# Patient Record
Sex: FEMALE | Race: WHITE | Employment: OTHER | URBAN - METROPOLITAN AREA
[De-identification: names, ages, dates, MRNs, and addresses within clinical notes are randomized per-mention and may not be internally consistent; named-entity substitution may affect disease eponyms.]

---

## 2022-03-08 ENCOUNTER — NEW PATIENT (OUTPATIENT)
Dept: URBAN - METROPOLITAN AREA CLINIC 26 | Facility: CLINIC | Age: 87
End: 2022-03-08

## 2022-03-08 VITALS
BODY MASS INDEX: 20.09 KG/M2 | HEART RATE: 60 BPM | SYSTOLIC BLOOD PRESSURE: 125 MMHG | HEIGHT: 66 IN | DIASTOLIC BLOOD PRESSURE: 74 MMHG | WEIGHT: 125 LBS

## 2022-03-08 DIAGNOSIS — H04.123: ICD-10-CM

## 2022-03-08 DIAGNOSIS — H35.3112: ICD-10-CM

## 2022-03-08 DIAGNOSIS — H35.3221: ICD-10-CM

## 2022-03-08 DIAGNOSIS — H35.353: ICD-10-CM

## 2022-03-08 PROCEDURE — 99204 OFFICE O/P NEW MOD 45 MIN: CPT

## 2022-03-08 ASSESSMENT — TONOMETRY
OD_IOP_MMHG: 14
OS_IOP_MMHG: 14

## 2022-03-08 ASSESSMENT — VISUAL ACUITY
OS_SC: 20/30-2
OD_SC: 20/50-1

## 2022-03-08 NOTE — PATIENT DISCUSSION
AVASTIN TX (#1) OS 2/24/2022 BY DR. Irene Yun - PT DOES NOT WANT TO GO BACK - WANTS TO COME HERE BASED ON HER FRIENDS RECOMENDATIONS.

## 2022-03-29 ENCOUNTER — FOLLOW UP (OUTPATIENT)
Dept: URBAN - METROPOLITAN AREA CLINIC 26 | Facility: CLINIC | Age: 87
End: 2022-03-29

## 2022-03-29 VITALS — DIASTOLIC BLOOD PRESSURE: 80 MMHG | HEART RATE: 58 BPM | HEIGHT: 55 IN | SYSTOLIC BLOOD PRESSURE: 139 MMHG

## 2022-03-29 DIAGNOSIS — H35.3221: ICD-10-CM

## 2022-03-29 DIAGNOSIS — H04.123: ICD-10-CM

## 2022-03-29 DIAGNOSIS — H35.353: ICD-10-CM

## 2022-03-29 DIAGNOSIS — H35.3112: ICD-10-CM

## 2022-03-29 PROCEDURE — 92250 FUNDUS PHOTOGRAPHY W/I&R: CPT

## 2022-03-29 PROCEDURE — 92014 COMPRE OPH EXAM EST PT 1/>: CPT

## 2022-03-29 PROCEDURE — 67028 INJECTION EYE DRUG: CPT

## 2022-03-29 PROCEDURE — 92235 FLUORESCEIN ANGRPH MLTIFRAME: CPT

## 2022-03-29 PROCEDURE — 92134 CPTRZ OPH DX IMG PST SGM RTA: CPT

## 2022-03-29 ASSESSMENT — VISUAL ACUITY
OS_SC: 20/40
OD_PH: 20/50
OD_SC: 20/80

## 2022-03-29 ASSESSMENT — TONOMETRY
OS_IOP_MMHG: 19
OD_IOP_MMHG: 15

## 2022-03-29 NOTE — PATIENT DISCUSSION
AVASTIN TX (#1) OS 2/24/2022 BY DR. Connor Deluna - PT DOES NOT WANT TO GO BACK - WANTS TO COME HERE BASED ON HER FRIENDS RECOMENDATIONS.

## 2022-03-29 NOTE — PROCEDURE NOTE: CLINICAL
PROCEDURE NOTE: Intravitreal Ozurdex OD. Diagnosis: Cystoid Macular Degeneration. Anesthesia: Subconjunctival. Prep: Betadine Flush. Prior to injection, risks/benefits/alternatives discussed including infection, loss of vision, hemorrhage, cataract, glaucoma, retinal tears or detachment and patient wished to proceed. The patient was seated in the examination chair. Topical anesthesia was induced with Proparicaine 0.5%. Subconjunctival xylocaine 2% approximately 0.5 cc was given for anesthesia. Betadine Prep was performed. The Ozurdex drug implant (dexamethasone 0.7 mg intravitreal implant) was injected into the vitreous cavity. The needle was passed 3.0 mm posterior to the limbus in pseudophakic patients, and 3.5 mm posterior to the limbus in phakic patients. The eye was stabilized using a q tip or cotton tip applicator, and the conjunctiva was displaced from the injection site. The remainder of the intravitreal Ozurdex in the single-use vial was then discarded in a medical waste disposal container. The implant settled inferiorly. The retina was examined following the injection. There was no evidence of hemorrhage, subretinal injection, or retinal detachment. Patient tolerated procedure well. There were no complications. Post-op instructions given. Lot # [id number]. Expiration date: 12:35PM/*. The patient was instructed of a follow up appointment in approximately 6 weeks for a limited exam and pressure check and was told to call immediately if the vision decreases and/or if the patient’s eye becomes red, painful, and/or light sensitive. If the patient is unable to reach the doctor within an hour or two, the patient is instructed to go to the emergency room or call 911. Inventory Id: *. The patient was instructed to use Artificial Tears q.i.d. p.r.n for comfort. Kenyetta Ely

## 2022-03-29 NOTE — PATIENT DISCUSSION
3 29 22 NO DEF FLUID - NO DEF CNV ON FA - ? EDEMA MIGHT HAVE BEEN DUE TO CME - TO HOLD ON ANTI-VEGF AND CONTINUE DROPS.

## 2022-03-31 ENCOUNTER — FOLLOW UP (OUTPATIENT)
Dept: URBAN - METROPOLITAN AREA CLINIC 26 | Facility: CLINIC | Age: 87
End: 2022-03-31

## 2022-03-31 DIAGNOSIS — H35.3221: ICD-10-CM

## 2022-03-31 DIAGNOSIS — H04.123: ICD-10-CM

## 2022-03-31 DIAGNOSIS — H35.3112: ICD-10-CM

## 2022-03-31 DIAGNOSIS — H35.353: ICD-10-CM

## 2022-03-31 PROCEDURE — 99211 OFF/OP EST MAY X REQ PHY/QHP: CPT

## 2022-03-31 ASSESSMENT — TONOMETRY
OS_IOP_MMHG: 11
OD_IOP_MMHG: 15

## 2022-03-31 ASSESSMENT — VISUAL ACUITY
OS_PH: 20/30-2
OD_SC: 20/40-2
OS_SC: 20/50+2

## 2022-03-31 NOTE — PATIENT DISCUSSION
AVASTIN TX (#1) OS 2/24/2022 BY DR. Eli Samuel - PT DOES NOT WANT TO GO BACK - WANTS TO COME HERE BASED ON HER FRIENDS RECOMENDATIONS. Breath sounds clear and equal bilaterally.

## 2022-04-19 ENCOUNTER — FOLLOW UP (OUTPATIENT)
Dept: URBAN - METROPOLITAN AREA CLINIC 26 | Facility: CLINIC | Age: 87
End: 2022-04-19

## 2022-04-19 VITALS
BODY MASS INDEX: 20.41 KG/M2 | SYSTOLIC BLOOD PRESSURE: 132 MMHG | HEIGHT: 66 IN | WEIGHT: 127 LBS | HEART RATE: 68 BPM | DIASTOLIC BLOOD PRESSURE: 80 MMHG

## 2022-04-19 DIAGNOSIS — H35.3112: ICD-10-CM

## 2022-04-19 DIAGNOSIS — H35.3221: ICD-10-CM

## 2022-04-19 DIAGNOSIS — H30.93: ICD-10-CM

## 2022-04-19 DIAGNOSIS — H35.353: ICD-10-CM

## 2022-04-19 DIAGNOSIS — H04.123: ICD-10-CM

## 2022-04-19 PROCEDURE — 92235 FLUORESCEIN ANGRPH MLTIFRAME: CPT

## 2022-04-19 PROCEDURE — 92014 COMPRE OPH EXAM EST PT 1/>: CPT

## 2022-04-19 PROCEDURE — 92134 CPTRZ OPH DX IMG PST SGM RTA: CPT

## 2022-04-19 PROCEDURE — 92250 FUNDUS PHOTOGRAPHY W/I&R: CPT

## 2022-04-19 PROCEDURE — 67028 INJECTION EYE DRUG: CPT

## 2022-04-19 ASSESSMENT — TONOMETRY
OS_IOP_MMHG: 18
OD_IOP_MMHG: 20

## 2022-04-19 ASSESSMENT — VISUAL ACUITY
OD_PH: 20/40-1
OS_SC: 20/30
OD_SC: 20/60-1

## 2022-04-19 NOTE — PROCEDURE NOTE: CLINICAL
PROCEDURE NOTE: Intravitreal Ozurdex OS. Diagnosis: Posterior Uveitis. Anesthesia: Topical. Prep: Betadine Flush. Prior to injection, risks/benefits/alternatives discussed including infection, loss of vision, hem06/01/2024orrhage, cataract, glaucoma, retinal tears or detachment and patient wished to proceed. The patient was seated in the examination chair. Topical anesthesia was induced with Proparicaine 0.5%. Subconjunctival xylocaine 2% approximately 0.5 cc was given for anesthesia. Betadine Prep was performed. The Ozurdex drug implant (dexamethasone 0.7 mg intravitreal implant) was injected into the vitreous cavity. The needle was passed 3.0 mm posterior to the limbus in pseudophakic patients, and 3.5 mm posterior to the limbus in phakic patients. The eye was stabilized using a q tip or cotton tip applicator, and the conjunctiva was displaced from the injection site. The remainder of the intravitreal Ozurdex in the single-use vial was then discarded in a medical waste disposal container. The implant settled inferiorly. The retina was examined following the injection. There was no evidence of hemorrhage, subretinal injection, or retinal detachment. Patient tolerated procedure well. There were no complications. Post-op instructions given. Lot # [id number]. Expiration date: */*. The patient was instructed of a follow up appointment in approximately 6 weeks for a limited exam and pressure check and was told to call immediately if the vision decreases and/or if the patient’s eye becomes red, painful, and/or light sensitive. If the patient is unable to reach the doctor within an hour or two, the patient is instructed to go to the emergency room or call 911. Inventory Id: *. The patient was instructed to use Artificial Tears q.i.d. p.r.n for comfort. Gaye Muro

## 2022-04-19 NOTE — PATIENT DISCUSSION
Impression: Age-related nuclear cataract, bilateral: H25.13. Condition: quality of life issue. Symptoms: could improve with surgery. Vision: vision affected. Plan: Cataracts account for some decreased vision, however, the patient is aware with macular changes that level of vision post operatively cannot be determined. Discussed risks, benefits, procedures and recovery. Patient desires surgery, recommend phacoemulsification with intraocular lens.  RL-2 Recommend standard IOL aim plano with Dexy Cu Well positioned.

## 2022-04-19 NOTE — PATIENT DISCUSSION
4 19 22 PROB CAUSE OF EDEMA OS- RECOM TRIAL WITH OZURDEX OS AS WORKED WELL OD AND FINDINGS LOOKS SIMILAR - OZURDEX OS AND STOP DROPS IN 2 DAYS.

## 2022-05-03 ENCOUNTER — TECH ONLY (OUTPATIENT)
Dept: URBAN - METROPOLITAN AREA CLINIC 26 | Facility: CLINIC | Age: 87
End: 2022-05-03

## 2022-05-03 DIAGNOSIS — H30.93: ICD-10-CM

## 2022-05-03 DIAGNOSIS — H04.123: ICD-10-CM

## 2022-05-03 DIAGNOSIS — H35.353: ICD-10-CM

## 2022-05-03 DIAGNOSIS — H35.3112: ICD-10-CM

## 2022-05-03 DIAGNOSIS — H35.3221: ICD-10-CM

## 2022-05-03 PROCEDURE — 99211 OFF/OP EST MAY X REQ PHY/QHP: CPT

## 2022-05-03 ASSESSMENT — VISUAL ACUITY
OS_PH: 20/25-1
OD_SC: 20/70-1
OS_SC: 20/40-1
OD_PH: 20/40+2

## 2022-05-03 ASSESSMENT — TONOMETRY
OD_IOP_MMHG: 16
OS_IOP_MMHG: 18

## 2022-05-03 NOTE — PATIENT DISCUSSION
AVASTIN TX (#1) OS 2/24/2022 BY DR. Dirk Reed - PT DOES NOT WANT TO GO BACK - WANTS TO COME HERE BASED ON HER FRIENDS RECOMENDATIONS.

## 2022-05-24 ENCOUNTER — FOLLOW UP (OUTPATIENT)
Dept: URBAN - METROPOLITAN AREA CLINIC 26 | Facility: CLINIC | Age: 87
End: 2022-05-24

## 2022-05-24 VITALS — HEIGHT: 55 IN | DIASTOLIC BLOOD PRESSURE: 67 MMHG | HEART RATE: 62 BPM | SYSTOLIC BLOOD PRESSURE: 131 MMHG

## 2022-05-24 DIAGNOSIS — H35.353: ICD-10-CM

## 2022-05-24 DIAGNOSIS — H04.123: ICD-10-CM

## 2022-05-24 DIAGNOSIS — H30.93: ICD-10-CM

## 2022-05-24 DIAGNOSIS — H35.3221: ICD-10-CM

## 2022-05-24 DIAGNOSIS — H35.3112: ICD-10-CM

## 2022-05-24 PROCEDURE — 92134 CPTRZ OPH DX IMG PST SGM RTA: CPT

## 2022-05-24 PROCEDURE — 92235 FLUORESCEIN ANGRPH MLTIFRAME: CPT

## 2022-05-24 PROCEDURE — 92014 COMPRE OPH EXAM EST PT 1/>: CPT

## 2022-05-24 PROCEDURE — 92250 FUNDUS PHOTOGRAPHY W/I&R: CPT

## 2022-05-24 ASSESSMENT — TONOMETRY
OD_IOP_MMHG: 18
OS_IOP_MMHG: 16

## 2022-05-24 ASSESSMENT — VISUAL ACUITY
OS_CC: 20/30+2
OD_SC: 20/40
OS_SC: 20/40+1
OD_CC: 20/40+2

## 2023-01-11 NOTE — PATIENT DISCUSSION
3 29 22 MILD EDEMA OD DESPITE DROPS - OZURDEX OD - THEN STOP OD IN 2 DAYS.
3 29 22 NO DEF FLUID - NO DEF CNV ON FA - ? EDEMA MIGHT HAVE BEEN DUE TO CME - TO HOLD ON ANTI-VEGF AND CONTINUE DROPS.
3 29 22 OZURDEX - MILD EDEMA.
3 8 22 TO REEVAL IN @ 3 WKS - TO SEE HOW SHE RESPONDED TO HER TREATMENT.
3/31/2022 [facility]NL KEEP SCHEDULED F/U.
4 19 22 NEW FLUID OS - SEE POSTERIOR UVEITIS.
4 19 22 PROB CAUSE OF EDEMA OS- RECOM TRIAL WITH OZURDEX OS AS WORKED WELL OD AND FINDINGS LOOKS SIMILAR - OZURDEX OS AND STOP DROPS IN 2 DAYS.
4 19 22 TRACE EDEMA - IMPROVED - STOP  DROPS -  TO FOLLOW.
5 24 22 DOING WELL AFTER OZURDEX - HOLD ON FURTHER ANTIVEGF.
5 24 22 NO RECURRENT EDEMA.
5 24 22 RESOLVED EDEMA - S/P OZURDEX - SUGGEST FLUID 2ND CME.
5/3/22 [facility] WNL KEEP SCHEDULED F/U.
ARTIFICIAL TEARS to affected eye(s) as needed.
AVASTIN TX (#1) OS 2/24/2022 BY DR. Mehul Guerrero - PT DOES NOT WANT TO GO BACK - WANTS TO COME HERE BASED ON HER FRIENDS RECOMENDATIONS.
BMI Within normal limits, continue current management.
BY HX - ON ACULAR - CONTINUE DROPS AND REEVAL IN 3 WKS.
CONSIDER YAG CONSULT AFTER FUTHER TX OF AMD.
Does NOT APPEAR VISUALLY SIGNIFICANT.
FORMER SMOKER.
MONITOR response to TREATMENT.
My findings and recommendations are based on patient's symptoms, eye exam, diagnostic testing, and records.
Personally reviewed patients records from referring Physician.
Recommend OBSERVATION and continued MONITORING for progression to exudative AMD.
Recommended OBSERVATION and MONITORING for change.
Recommended OBSERVATION and continued MONITORING for progression.
Recommended weight and BMI control through healthy diet and exercise, green leafy veggies, UV protection, and not smoking. Reviewed the benefits of AREDS II VITAMINS VERUS GENOTYPE directed vitamin therapy, and recommended following one or the other to try and prevent the progression of the disease. Reviewed the importance of daily monitoring of the vision in each eye independently, along with the use of the Amsler grid daily and instructed patient to call and return immediately for any new changes in their vision or on the Amsler grid. Patient instructed on the importance of regular follow up and monitoring for the early detection of conversion to wet AMD as early detection results in early treatment and better outcomes.
SEE POSTERIOR UVEITIS.
The patient is at high risk for a choroidal neovascular membrane. NO CNV SEEN TODAY. 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
Well positioned.
23.3